# Patient Record
Sex: FEMALE | Race: WHITE | NOT HISPANIC OR LATINO | Employment: OTHER
[De-identification: names, ages, dates, MRNs, and addresses within clinical notes are randomized per-mention and may not be internally consistent; named-entity substitution may affect disease eponyms.]

---

## 2018-08-21 NOTE — PATIENT DISCUSSION
Cataract No Surgery Counseling: Not visually significant to proceed with surgery at this time. I have discussed continuing with current spectacles vs updating. I have offered the patient a new spectacle prescription to fill if desires. Return to follow up as scheduled or sooner if symptoms arise.

## 2019-06-21 ENCOUNTER — CATARACT CONSULT (OUTPATIENT)
Age: 54
End: 2019-06-21

## 2019-06-21 DIAGNOSIS — H25.811: ICD-10-CM

## 2019-06-21 DIAGNOSIS — H25.812: ICD-10-CM

## 2019-06-21 PROCEDURE — 92004 COMPRE OPH EXAM NEW PT 1/>: CPT

## 2019-06-21 PROCEDURE — V2799I IMPRIMIS PREDGATIBROM

## 2019-06-24 ASSESSMENT — VISUAL ACUITY
OS_SC: J8
OS_SC: 20/200
OS_BAT: 20/200 WITH MR
OD_CC: 20/30-1
OD_BAT: 20/200 WITH MR
OD_SC: 20/70
OD_SC: J8
OS_CC: 20/40

## 2019-06-24 ASSESSMENT — TONOMETRY
OD_IOP_MMHG: 10
OS_IOP_MMHG: 10

## 2019-07-10 ENCOUNTER — PRE-OP/H&P (OUTPATIENT)
Dept: URBAN - METROPOLITAN AREA CLINIC 39 | Facility: CLINIC | Age: 54
End: 2019-07-10

## 2019-07-10 ENCOUNTER — POST-OP CATARACT (OUTPATIENT)
Dept: URBAN - METROPOLITAN AREA CLINIC 39 | Facility: CLINIC | Age: 54
End: 2019-07-10

## 2019-07-10 ENCOUNTER — SURGERY/PROCEDURE (OUTPATIENT)
Dept: URBAN - METROPOLITAN AREA SURGERY 14 | Facility: SURGERY | Age: 54
End: 2019-07-10

## 2019-07-10 DIAGNOSIS — H25.812: ICD-10-CM

## 2019-07-10 DIAGNOSIS — H25.811: ICD-10-CM

## 2019-07-10 DIAGNOSIS — Z96.1: ICD-10-CM

## 2019-07-10 PROCEDURE — 99211T TECH SERVICE

## 2019-07-10 PROCEDURE — 6698454 REMOVE CATARACT;INSERT LENS (SX ONLY)

## 2019-07-10 ASSESSMENT — VISUAL ACUITY: OD_SC: 20/100

## 2019-07-10 ASSESSMENT — TONOMETRY: OD_IOP_MMHG: 15

## 2019-07-15 ENCOUNTER — EST. PATIENT EMERGENCY (OUTPATIENT)
Dept: URBAN - METROPOLITAN AREA CLINIC 35 | Facility: CLINIC | Age: 54
End: 2019-07-15

## 2019-07-15 DIAGNOSIS — H25.812: ICD-10-CM

## 2019-07-15 DIAGNOSIS — Z96.1: ICD-10-CM

## 2019-07-15 PROCEDURE — 92012 INTRM OPH EXAM EST PATIENT: CPT

## 2019-07-15 PROCEDURE — 99024 POSTOP FOLLOW-UP VISIT: CPT

## 2019-07-15 ASSESSMENT — VISUAL ACUITY
OD_PH: 20/25-1
OD_SC: 20/50-1

## 2019-07-15 ASSESSMENT — TONOMETRY: OD_IOP_MMHG: 16

## 2019-07-18 ENCOUNTER — POST OP/EVAL OF SECOND EYE (OUTPATIENT)
Dept: URBAN - METROPOLITAN AREA CLINIC 39 | Facility: CLINIC | Age: 54
End: 2019-07-18

## 2019-07-18 ENCOUNTER — SURGERY/PROCEDURE (OUTPATIENT)
Dept: URBAN - METROPOLITAN AREA SURGERY 14 | Facility: SURGERY | Age: 54
End: 2019-07-18

## 2019-07-18 DIAGNOSIS — H25.812: ICD-10-CM

## 2019-07-18 PROCEDURE — 6698454 REMOVE CATARACT;INSERT LENS (SX ONLY)

## 2019-07-18 PROCEDURE — 99213 OFFICE O/P EST LOW 20 MIN: CPT

## 2019-07-18 ASSESSMENT — VISUAL ACUITY
OD_SC: 20/30-1
OS_BAT: 20/200 WITH MR
OS_SC: 20/200

## 2019-07-18 ASSESSMENT — TONOMETRY
OD_IOP_MMHG: 13
OS_IOP_MMHG: 13

## 2019-07-19 ENCOUNTER — CATARACT POST-OP 1-DAY (OUTPATIENT)
Dept: URBAN - METROPOLITAN AREA CLINIC 35 | Facility: CLINIC | Age: 54
End: 2019-07-19

## 2019-07-19 DIAGNOSIS — Z96.1: ICD-10-CM

## 2019-07-19 PROCEDURE — 99024 POSTOP FOLLOW-UP VISIT: CPT

## 2019-07-19 ASSESSMENT — VISUAL ACUITY
OD_SC: 20/30
OS_SC: 20/200+1

## 2019-07-19 ASSESSMENT — TONOMETRY: OS_IOP_MMHG: 15

## 2021-04-14 ENCOUNTER — CONSULT (OUTPATIENT)
Dept: URBAN - METROPOLITAN AREA CLINIC 39 | Facility: CLINIC | Age: 56
End: 2021-04-14

## 2021-04-14 DIAGNOSIS — H02.831: ICD-10-CM

## 2021-04-14 DIAGNOSIS — H02.835: ICD-10-CM

## 2021-04-14 DIAGNOSIS — H02.832: ICD-10-CM

## 2021-04-14 DIAGNOSIS — L98.8: ICD-10-CM

## 2021-04-14 DIAGNOSIS — H02.834: ICD-10-CM

## 2021-04-14 PROCEDURE — 92285 EXTERNAL OCULAR PHOTOGRAPHY: CPT

## 2021-04-14 PROCEDURE — 99213 OFFICE O/P EST LOW 20 MIN: CPT

## 2021-04-14 ASSESSMENT — VISUAL ACUITY
OD_CC: 20/30-1
OS_CC: 20/30+2

## 2022-03-17 NOTE — PATIENT DISCUSSION
Discussed findings, not visually significant at this time. New glasses Rx prescribed. Pt to call with any changes to vision. Monitor.

## 2024-03-20 ENCOUNTER — ESTABLISHED PATIENT (OUTPATIENT)
Dept: URBAN - METROPOLITAN AREA CLINIC 35 | Facility: CLINIC | Age: 59
End: 2024-03-20

## 2024-03-20 VITALS — WEIGHT: 170 LBS | HEIGHT: 63 IN | BODY MASS INDEX: 30.12 KG/M2

## 2024-03-20 DIAGNOSIS — H04.123: ICD-10-CM

## 2024-03-20 DIAGNOSIS — H02.831: ICD-10-CM

## 2024-03-20 DIAGNOSIS — H02.832: ICD-10-CM

## 2024-03-20 DIAGNOSIS — H02.835: ICD-10-CM

## 2024-03-20 DIAGNOSIS — L98.8: ICD-10-CM

## 2024-03-20 DIAGNOSIS — H02.834: ICD-10-CM

## 2024-03-20 PROCEDURE — 92285 EXTERNAL OCULAR PHOTOGRAPHY: CPT

## 2024-03-20 PROCEDURE — 99214 OFFICE O/P EST MOD 30 MIN: CPT

## 2024-03-20 ASSESSMENT — VISUAL ACUITY
OD_CC: 20/30-1
OS_CC: 20/30-2

## 2024-04-24 ENCOUNTER — TECH ONLY (OUTPATIENT)
Dept: URBAN - METROPOLITAN AREA CLINIC 39 | Facility: CLINIC | Age: 59
End: 2024-04-24

## 2024-04-24 DIAGNOSIS — H02.834: ICD-10-CM

## 2024-04-24 DIAGNOSIS — H02.835: ICD-10-CM

## 2024-04-24 DIAGNOSIS — H02.832: ICD-10-CM

## 2024-04-24 DIAGNOSIS — H02.831: ICD-10-CM

## 2024-04-24 PROCEDURE — 92081 LIMITED VISUAL FIELD XM: CPT

## 2024-05-28 ENCOUNTER — SURGERY/PROCEDURE (OUTPATIENT)
Dept: URBAN - METROPOLITAN AREA SURGERY 14 | Facility: SURGERY | Age: 59
End: 2024-05-28

## 2024-05-28 ENCOUNTER — PRE-OP/H&P (OUTPATIENT)
Dept: URBAN - METROPOLITAN AREA CLINIC 39 | Facility: CLINIC | Age: 59
End: 2024-05-28

## 2024-05-28 DIAGNOSIS — H02.834: ICD-10-CM

## 2024-05-28 DIAGNOSIS — H02.831: ICD-10-CM

## 2024-05-28 PROCEDURE — 15823 BLEPHARP UPR EYELID XCSV SKN: CPT

## 2024-05-28 PROCEDURE — 99211HP H&P OFFICE/OUTPATIENT VISIT, EST

## 2024-06-05 ENCOUNTER — POST-OP (OUTPATIENT)
Dept: URBAN - METROPOLITAN AREA CLINIC 35 | Facility: CLINIC | Age: 59
End: 2024-06-05

## 2024-06-05 DIAGNOSIS — Z98.890: ICD-10-CM

## 2024-06-05 PROCEDURE — 92285 EXTERNAL OCULAR PHOTOGRAPHY: CPT

## 2024-06-05 PROCEDURE — 13160 SEC CLSR SURG WND/DEHSN XTN: CPT | Mod: 24,78

## 2024-06-26 ENCOUNTER — POST-OP (OUTPATIENT)
Dept: URBAN - METROPOLITAN AREA CLINIC 35 | Facility: CLINIC | Age: 59
End: 2024-06-26

## 2024-06-26 DIAGNOSIS — Z98.890: ICD-10-CM

## 2024-06-26 PROCEDURE — 92285 EXTERNAL OCULAR PHOTOGRAPHY: CPT

## 2024-06-26 PROCEDURE — 99024 POSTOP FOLLOW-UP VISIT: CPT
